# Patient Record
Sex: MALE
[De-identification: names, ages, dates, MRNs, and addresses within clinical notes are randomized per-mention and may not be internally consistent; named-entity substitution may affect disease eponyms.]

---

## 2018-03-07 ENCOUNTER — TRANSCRIPTION ENCOUNTER (OUTPATIENT)
Age: 28
End: 2018-03-07

## 2018-03-07 ENCOUNTER — EMERGENCY (EMERGENCY)
Facility: HOSPITAL | Age: 28
LOS: 1 days | Discharge: HOME | End: 2018-03-07
Attending: EMERGENCY MEDICINE
Payer: SELF-PAY

## 2018-03-07 VITALS
RESPIRATION RATE: 18 BRPM | SYSTOLIC BLOOD PRESSURE: 142 MMHG | TEMPERATURE: 98 F | OXYGEN SATURATION: 100 % | HEIGHT: 70 IN | DIASTOLIC BLOOD PRESSURE: 76 MMHG | HEART RATE: 83 BPM | WEIGHT: 145.06 LBS

## 2018-03-07 PROCEDURE — 99282 EMERGENCY DEPT VISIT SF MDM: CPT

## 2018-03-07 NOTE — ED ADULT TRIAGE NOTE - CHIEF COMPLAINT QUOTE
"I was hit in the face with a ball on Friday." no pain. "I was hit in the face with a ball on Friday." no pain, left nasal congestion. "I was hit in the face with a ball on Friday." no pain, or difficulty breathing. left nasal congestion.

## 2018-03-07 NOTE — ED PROVIDER NOTE - ATTENDING CONTRIBUTION TO CARE
Patient with nasal bridge injury. No nausea/vomiting. No loss of consciousness. Mild discomfort and swelling to bridge of nose and patient concerned that he may have a fracture. Wound occurred 5 days prior.   nasal bridge with mild asymmetrical swelling on the right, cooperative, with capacity and insight, alert, perrl, trachea midline, non-distended, non-tachypneic, no asymmetry of nasal bridge line  patient concerned for fracture of nasal bones and on timeline for repair  patient educated that  follow up md will want own imaging and will await swelling to go down but will give phone numbers for  follow up  No immediate life threatening issues present on history or clinical exam. Patient is a safe disposition home, has capacity and insight into their condition, ambulatroy in the Emergency Department and will follow up with their doctor(s) this week. Patient  understands anticipatory guidance and was given strict return and follow up precautions. The patient has been informed of all concerning signs and symptoms to return to Emergency Department, the necessity to follow up with the PMD/Clinic/follow up provided within 2-3 days was explained, and the patient reports understanding of above with capacity and insight.

## 2018-03-07 NOTE — ED PROVIDER NOTE - OBJECTIVE STATEMENT
26 yo male in room 6 presents to the ER for evaluation of nasal injury. PT states "On Friday 5 days ago I was hit in the face with a volleyball and because I was wearing my glasses they pushed into my nose and caused injury and I want to get checked out to see if  it needs to be fixed immediately". Pt denies LOC at time of injury.  Superficial abrasion 2cm to bridge of nose and superficial abrasion to right side of nose. Tdap  utd as per pt.  Pt denies pain at this time.

## 2018-03-07 NOTE — ED ADULT NURSE NOTE - OBJECTIVE STATEMENT
28 yo male presents to ED following being hit in the face with a volleyball 5d ago. Patient reports noticing deviation of nose and congestion to the left side. At this time in the ED, patient has mild edema on the right side of nose and mild deviation to the left. Patient has no tenderness or krepitus over the nose when palpated. There is no cute distress. Safety and comfort maintained. Will continue to monitor.

## 2018-03-07 NOTE — ED PROVIDER NOTE - MEDICAL DECISION MAKING DETAILS
s/p facial injury from being hit in face with volley ball- no imaging needed- pt to f/u with  facial/plastics s/p facial injury from being hit in face with volley ball- no imaging needed- pt to f/u with  facial/plastics  to limit radiation and await swelling to recede

## 2018-03-07 NOTE — ED PROVIDER NOTE - NOSE FINDINGS
abrasion to bridge of nose and right side of nose with ecchymosis. No septal hematoma, no obvious deformity

## 2018-03-07 NOTE — ED PROVIDER NOTE - PLAN OF CARE
follow up with Dr. Trae Pérez or Dr. arthur-   return to the ER for any concerns follow up with Dr. Trae Pérez or Dr. Trivedi-   return to the ER for any concerns

## 2018-03-07 NOTE — ED PROVIDER NOTE - CARE PLAN
Principal Discharge DX:	Nose injury, initial encounter  Assessment and plan of treatment:	follow up with Dr. Trae Pérez or Dr. arthur-   return to the ER for any concerns

## 2019-07-10 ENCOUNTER — EMERGENCY (EMERGENCY)
Facility: HOSPITAL | Age: 29
LOS: 0 days | Discharge: HOME | End: 2019-07-10
Attending: EMERGENCY MEDICINE | Admitting: EMERGENCY MEDICINE
Payer: SUBSIDIZED

## 2019-07-10 ENCOUNTER — OUTPATIENT (OUTPATIENT)
Dept: OUTPATIENT SERVICES | Facility: HOSPITAL | Age: 29
LOS: 1 days | Discharge: HOME | End: 2019-07-10
Payer: SUBSIDIZED

## 2019-07-10 VITALS
DIASTOLIC BLOOD PRESSURE: 60 MMHG | RESPIRATION RATE: 18 BRPM | OXYGEN SATURATION: 97 % | TEMPERATURE: 98 F | SYSTOLIC BLOOD PRESSURE: 123 MMHG | HEART RATE: 77 BPM

## 2019-07-10 DIAGNOSIS — R10.31 RIGHT LOWER QUADRANT PAIN: ICD-10-CM

## 2019-07-10 DIAGNOSIS — R10.819 ABDOMINAL TENDERNESS, UNSPECIFIED SITE: ICD-10-CM

## 2019-07-10 DIAGNOSIS — Z79.2 LONG TERM (CURRENT) USE OF ANTIBIOTICS: ICD-10-CM

## 2019-07-10 DIAGNOSIS — K52.9 NONINFECTIVE GASTROENTERITIS AND COLITIS, UNSPECIFIED: ICD-10-CM

## 2019-07-10 PROCEDURE — 74177 CT ABD & PELVIS W/CONTRAST: CPT | Mod: 26

## 2019-07-10 PROCEDURE — 99283 EMERGENCY DEPT VISIT LOW MDM: CPT

## 2019-07-10 RX ORDER — METRONIDAZOLE 500 MG
1 TABLET ORAL
Qty: 30 | Refills: 0
Start: 2019-07-10 | End: 2019-07-19

## 2019-07-10 RX ORDER — MOXIFLOXACIN HYDROCHLORIDE TABLETS, 400 MG 400 MG/1
1 TABLET, FILM COATED ORAL
Qty: 20 | Refills: 0
Start: 2019-07-10 | End: 2019-07-19

## 2019-07-10 NOTE — ED PROVIDER NOTE - OBJECTIVE STATEMENT
27 y/o M with no PMH presents with lower right abdominal pain for a few days. Pt has no fever or chills. Pt tried bowel rest yesterday without improvements. Pt is worse in the hospital. Obtaining CT with [place] and IV contrast. Showed colitis of the cecum and ascending colon.

## 2019-07-10 NOTE — ED PROVIDER NOTE - NSFOLLOWUPINSTRUCTIONS_ED_ALL_ED_FT
Colitis    Colitis is inflammation of the colon. Colitis may last a short time (acute) or it may last a long time (chronic).     CAUSES  This condition may be caused by:    Viruses.  Bacteria.  Reactions to medicine.  Certain autoimmune diseases, such as Crohn disease or ulcerative colitis.    SYMPTOMS  Symptoms of this condition include:    Diarrhea.  Passing bloody or tarry stool.  Pain.  Fever.  Vomiting.  Tiredness (fatigue).  Weight loss.  Bloating.  Sudden increase in abdominal pain.  Having fewer bowel movements than usual.    DIAGNOSIS  This condition is diagnosed with a stool test or a blood test. You may also have other tests, including X-rays, a CT scan, or a colonoscopy.    TREATMENT  Treatment may include:    Resting the bowel. This involves not eating or drinking for a period of time.  Fluids that are given through an IV tube.  Medicine for pain and diarrhea.  Antibiotic medicines.  Cortisone medicines.  Surgery.    HOME CARE INSTRUCTIONS  Eating and Drinking    Follow instructions from your health care provider about eating or drinking restrictions.  Drink enough fluid to keep your urine clear or pale yellow.  Work with a dietitian to determine which foods cause your condition to flare up.  Avoid foods that cause flare-ups.  Eat a well-balanced diet.    Medicines    Take over-the-counter and prescription medicines only as told by your health care provider.  If you were prescribed an antibiotic medicine, take it as told by your health care provider. Do not stop taking the antibiotic even if you start to feel better.    General Instructions    Keep all follow-up visits as told by your health care provider. This is important.    SEEK MEDICAL CARE IF:  Your symptoms do not go away.  You develop new symptoms.    SEEK IMMEDIATE MEDICAL CARE IF:  You have a fever that does not go away with treatment.  You develop chills.  You have extreme weakness, fainting, or dehydration.  You have repeated vomiting.  You develop severe pain in your abdomen.  You pass bloody or tarry stool.    ADDITIONAL NOTES AND INSTRUCTIONS    Please follow up with your Primary MD in 24-48 hr.  Seek immediate medical care for any new/worsening signs or symptoms.

## 2019-07-10 NOTE — ED PROVIDER NOTE - NS ED ROS FT
Constitutional: See HPI. No fever/chills.  Eyes: No visual changes, eye pain or discharge.  ENT: No hearing changes, pain, discharge or infections.  Neck: No neck pain or stiffness.  Cardiac: No chest pain, SOB or edema. No chest pain with exertion.  Respiratory: No cough or respiratory distress. No hemoptysis.   GI: No nausea, vomiting, diarrhea or abdominal pain.  : No dysuria, frequency or burning.   MS: No myalgia, muscle weakness, joint pain or back pain.  Neuro: No headache or weakness. No LOC.  Skin: No rash.  Except as documented in the HPI, all other systems are negative. Constitutional: See HPI. No fever/chills.  Eyes: No visual changes, eye pain or discharge.  ENT: No hearing changes, pain, discharge or infections.  Neck: No neck pain or stiffness.  Cardiac: No chest pain, SOB or edema. No chest pain with exertion.  Respiratory: No cough or respiratory distress. No hemoptysis.   GI: see hpi  : No dysuria, frequency or burning.   MS: No myalgia, muscle weakness, joint pain or back pain.  Neuro: No headache or weakness. No LOC.  Skin: No rash.  Except as documented in the HPI, all other systems are negative.

## 2019-07-10 NOTE — ED PROVIDER NOTE - PHYSICAL EXAMINATION
Physical Exam: VS reviewed. Pt is well appearing, in no respiratory distress. MMM. Cap refill <2 seconds.   TMs normal b/l, no erythema, no dullness, no hemotympanum.   Eyes normal with no injection, no discharge, EOMI.  Pharynx with no erythema, no exudates, no stomatitis. No anterior cervical lymph nodes appreciated.   No skin rash noted.   Chest is clear, no wheezing, rales or crackles. No retractions, no distress. Normal and equal breath sounds. Normal heart sounds, no muffling, no murmur appreciated. Abdomen: (+) Mild RLQ pain, soft, ND, no guarding,   Neuro exam grossly intact.

## 2019-07-10 NOTE — ED ADULT NURSE NOTE - OBJECTIVE STATEMENT
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

## 2020-04-26 ENCOUNTER — MESSAGE (OUTPATIENT)
Age: 30
End: 2020-04-26

## 2020-05-04 ENCOUNTER — APPOINTMENT (OUTPATIENT)
Dept: DISASTER EMERGENCY | Facility: HOSPITAL | Age: 30
End: 2020-05-04

## 2020-05-05 LAB
SARS-COV-2 IGG SERPL IA-ACNC: 6.4 INDEX
SARS-COV-2 IGG SERPL QL IA: POSITIVE

## 2020-10-13 ENCOUNTER — TRANSCRIPTION ENCOUNTER (OUTPATIENT)
Age: 30
End: 2020-10-13

## 2021-08-18 ENCOUNTER — TRANSCRIPTION ENCOUNTER (OUTPATIENT)
Age: 31
End: 2021-08-18

## 2022-01-07 ENCOUNTER — TRANSCRIPTION ENCOUNTER (OUTPATIENT)
Age: 32
End: 2022-01-07

## 2022-01-18 ENCOUNTER — TRANSCRIPTION ENCOUNTER (OUTPATIENT)
Age: 32
End: 2022-01-18

## 2023-04-11 ENCOUNTER — LABORATORY RESULT (OUTPATIENT)
Age: 33
End: 2023-04-11

## 2023-04-11 ENCOUNTER — NON-APPOINTMENT (OUTPATIENT)
Age: 33
End: 2023-04-11

## 2023-04-11 ENCOUNTER — APPOINTMENT (OUTPATIENT)
Dept: INTERNAL MEDICINE | Facility: CLINIC | Age: 33
End: 2023-04-11
Payer: COMMERCIAL

## 2023-04-11 VITALS
OXYGEN SATURATION: 99 % | DIASTOLIC BLOOD PRESSURE: 82 MMHG | SYSTOLIC BLOOD PRESSURE: 137 MMHG | WEIGHT: 170 LBS | HEIGHT: 69 IN | HEART RATE: 81 BPM | TEMPERATURE: 98.1 F | BODY MASS INDEX: 25.18 KG/M2

## 2023-04-11 DIAGNOSIS — Z00.00 ENCOUNTER FOR GENERAL ADULT MEDICAL EXAMINATION W/OUT ABNORMAL FINDINGS: ICD-10-CM

## 2023-04-11 PROCEDURE — 36415 COLL VENOUS BLD VENIPUNCTURE: CPT

## 2023-04-11 PROCEDURE — 99385 PREV VISIT NEW AGE 18-39: CPT | Mod: 25

## 2023-04-12 NOTE — PHYSICAL EXAM
[Urethral Meatus] : meatus normal [Urinary Bladder Findings] : the bladder was normal on palpation [Scrotum] : the scrotum was normal [Rectal Exam - Seminal Vesicles] : the seminal vesicles were normal [Epididymis] : the epididymides were normal [Testes Tenderness] : no tenderness of the testes [Testes Mass (___cm)] : there were no testicular masses [Normal] : no joint swelling and grossly normal strength and tone [de-identified] : Multiple benign nevi

## 2023-04-12 NOTE — ASSESSMENT
[FreeTextEntry1] : Blood work done today, patient is medically fit to work, name of the sleep apnea specialist provided for sleep apnea.  Also name provided for dermatology for skin exam to advise at least SPF 30 sunblock

## 2023-04-12 NOTE — HISTORY OF PRESENT ILLNESS
[FreeTextEntry1] : Patient presents for annual physical [de-identified] : Currently works as a radiology resident.  Denies any past medical history, denies any previous family history surgeries.  Did jam the finger recently wearing splint.  Denies any chest pain chest tightness shortness of breath anxiety depression orthopnea paroxysmal nocturnal dyspnea.  Denies any issues with urination passing stool.  Does snore at night has had witnessed apneic episodes.

## 2023-04-12 NOTE — HEALTH RISK ASSESSMENT
[Good] : ~his/her~  mood as  good [FreeTextEntry1] : health maintenance [0] : 2) Feeling down, depressed, or hopeless: Not at all (0) [PHQ-2 Negative - No further assessment needed] : PHQ-2 Negative - No further assessment needed [de-identified] : none [[id number]] : 0

## 2023-04-17 LAB
25(OH)D3 SERPL-MCNC: 14.2 NG/ML
ALBUMIN SERPL ELPH-MCNC: 4.6 G/DL
ALP BLD-CCNC: 98 U/L
ALT SERPL-CCNC: 12 U/L
ANION GAP SERPL CALC-SCNC: 11 MMOL/L
APPEARANCE: CLEAR
AST SERPL-CCNC: 13 U/L
BASOPHILS # BLD AUTO: 0.05 K/UL
BASOPHILS NFR BLD AUTO: 0.6 %
BILIRUB SERPL-MCNC: 0.3 MG/DL
BILIRUBIN URINE: NEGATIVE
BLOOD URINE: ABNORMAL
BUN SERPL-MCNC: 12 MG/DL
CALCIUM SERPL-MCNC: 9.7 MG/DL
CHLORIDE SERPL-SCNC: 103 MMOL/L
CHOLEST SERPL-MCNC: 220 MG/DL
CO2 SERPL-SCNC: 26 MMOL/L
COLOR: NORMAL
CREAT SERPL-MCNC: 0.96 MG/DL
EGFR: 107 ML/MIN/1.73M2
EOSINOPHIL # BLD AUTO: 0.21 K/UL
EOSINOPHIL NFR BLD AUTO: 2.4 %
ESTIMATED AVERAGE GLUCOSE: 103 MG/DL
GLUCOSE QUALITATIVE U: NEGATIVE
GLUCOSE SERPL-MCNC: 116 MG/DL
HBA1C MFR BLD HPLC: 5.2 %
HCT VFR BLD CALC: 43.3 %
HDLC SERPL-MCNC: 58 MG/DL
HGB BLD-MCNC: 13.8 G/DL
IMM GRANULOCYTES NFR BLD AUTO: 0.1 %
KETONES URINE: NEGATIVE
LDLC SERPL CALC-MCNC: 130 MG/DL
LEUKOCYTE ESTERASE URINE: NEGATIVE
LYMPHOCYTES # BLD AUTO: 3.38 K/UL
LYMPHOCYTES NFR BLD AUTO: 39.3 %
MAN DIFF?: NORMAL
MCHC RBC-ENTMCNC: 29.4 PG
MCHC RBC-ENTMCNC: 31.9 GM/DL
MCV RBC AUTO: 92.3 FL
MONOCYTES # BLD AUTO: 0.47 K/UL
MONOCYTES NFR BLD AUTO: 5.5 %
NEUTROPHILS # BLD AUTO: 4.49 K/UL
NEUTROPHILS NFR BLD AUTO: 52.1 %
NITRITE URINE: NEGATIVE
NONHDLC SERPL-MCNC: 162 MG/DL
PH URINE: 6.5
PLATELET # BLD AUTO: 255 K/UL
POTASSIUM SERPL-SCNC: 3.9 MMOL/L
PROT SERPL-MCNC: 7.1 G/DL
PROTEIN URINE: NORMAL
RBC # BLD: 4.69 M/UL
RBC # FLD: 12.3 %
SODIUM SERPL-SCNC: 141 MMOL/L
SPECIFIC GRAVITY URINE: 1.02
TRIGL SERPL-MCNC: 161 MG/DL
TSH SERPL-ACNC: 1.9 UIU/ML
UROBILINOGEN URINE: NORMAL
VIT B12 SERPL-MCNC: 227 PG/ML
WBC # FLD AUTO: 8.61 K/UL

## 2023-04-18 RX ORDER — ERGOCALCIFEROL 1.25 MG/1
1.25 MG CAPSULE ORAL
Qty: 12 | Refills: 0 | Status: COMPLETED | COMMUNITY
Start: 2023-04-18 | End: 2023-07-17

## 2023-05-19 ENCOUNTER — APPOINTMENT (OUTPATIENT)
Dept: ORTHOPEDIC SURGERY | Facility: CLINIC | Age: 33
End: 2023-05-19
Payer: COMMERCIAL

## 2023-05-19 DIAGNOSIS — M20.011 MALLET FINGER OF RIGHT FINGER(S): ICD-10-CM

## 2023-05-19 PROCEDURE — 73140 X-RAY EXAM OF FINGER(S): CPT | Mod: RT

## 2023-05-19 PROCEDURE — 29130 APPL FINGER SPLINT STATIC: CPT

## 2023-05-19 PROCEDURE — 99204 OFFICE O/P NEW MOD 45 MIN: CPT | Mod: 25

## 2023-05-19 NOTE — ASSESSMENT
[FreeTextEntry1] : Patient comes in with pain in his right middle finger. In March he was putting his hand in his pocket and felt a pop.  He started splinting when it first happened for 3 weeks.  He then played volleyball and the splints came off the finger.  He noticed he had drooping again.  He then after that was splinting for 8 weeks.\par \par Right hand: Discolored skin from PIP joint distally secondary to splinting, nontender ovation long finger, decreased flexion of the finger, able to extend with a neutral finger, neurovascular intact\par \par X-rays are negative\par \par The pathology was discussed with the patient at length including the use of diagrams drawn in the office.  We discussed that the recommended treatment is closed treatment with full time splinting for 6weeks.  During those 6 weeks the splint cannot come off- if the finger tip bends for even a fraction of a second the healing tissue tears and the 6 week period would start all over again.  The splint must remain clean and dry and needs to be well covered in the shower.  The patient verbalized understanding of the need for full time 24/7 splinting.  We also discussed the possibility of pin placement surgically to allow a splint to come on and off, although it must be worn except for hygiene to protect the pin and prevent bending of the pin.  We discussed that the only true indication for surgery is an incongruent joint.   We discussed that there were 4 possibilities: a perfect finger, a stiff finger, a bent finger or a dorsal bump over the tendon attachment site.  There could be combinations of stiff, bent and bumpy finger. We discussed that after 6 weeks of full time splinting I recommend 6 weeks of nighttime splinting.  The patient understands that splinting may lead to skin breakdown under the splint.  There is also a possibility of surgery in the future up to and including fusion.  The patient agrees to recommended plan of splinting.\par \par Patient will start night-time splinting for 6 weeks as he has already done full-time splinting for 8 weeks. He was advised to wear the splint when he is active as well. He will follow up in 3 weeks.  If prior to that he starts to have significant drooping more than 10 to 15 degrees he should start full-time splinting.  If he has any issues he will let me know.  We discussed specifics on pressure tension along the finger.

## 2023-06-06 ENCOUNTER — APPOINTMENT (OUTPATIENT)
Dept: ORTHOPEDIC SURGERY | Facility: CLINIC | Age: 33
End: 2023-06-06

## 2023-06-09 ENCOUNTER — APPOINTMENT (OUTPATIENT)
Dept: PULMONOLOGY | Facility: CLINIC | Age: 33
End: 2023-06-09
Payer: COMMERCIAL

## 2023-06-09 VITALS
WEIGHT: 165.6 LBS | BODY MASS INDEX: 24.53 KG/M2 | HEART RATE: 64 BPM | OXYGEN SATURATION: 98 % | HEIGHT: 69 IN | DIASTOLIC BLOOD PRESSURE: 78 MMHG | SYSTOLIC BLOOD PRESSURE: 123 MMHG | TEMPERATURE: 98 F

## 2023-06-09 PROCEDURE — 99203 OFFICE O/P NEW LOW 30 MIN: CPT

## 2023-06-09 NOTE — HISTORY OF PRESENT ILLNESS
[Never] : never [Obstructive Sleep Apnea] : obstructive sleep apnea [Awakes Unrefreshed] : awakes unrefreshed [Awakes with Dry Mouth] : awakes with dry mouth [Snoring] : snoring [Tired while Driving] : tired while driving [Witnessed Apneas] : witnessed apneas [TextBox_4] : Patient here for initial sleep apnea evaluation.  Reports chronic nonrestorative sleep.  Difficulty maintaining alertness while driving.  Was having 1 hour commute to work every day and was difficult to stay awake while driving.  Commute plan is changing.\par Also reports difficulty with lying flat.\par Chokes when he lies flat.  Does not report acid reflux symptoms. [Awakes with Headache] : does not awaken with headache [Frequent Nocturnal Awakening] : denies frequent nocturnal awakening

## 2023-06-09 NOTE — PHYSICAL EXAM
[No Acute Distress] : no acute distress [Normal Appearance] : normal appearance [No Neck Mass] : no neck mass [Normal Rate/Rhythm] : normal rate/rhythm [Normal S1, S2] : normal s1, s2 [No Murmurs] : no murmurs [No Resp Distress] : no resp distress [Clear to Auscultation Bilaterally] : clear to auscultation bilaterally [No Abnormalities] : no abnormalities [Benign] : benign [Normal Gait] : normal gait [No Clubbing] : no clubbing [No Cyanosis] : no cyanosis [No Edema] : no edema [FROM] : FROM [Normal Color/ Pigmentation] : normal color/ pigmentation [No Focal Deficits] : no focal deficits [Oriented x3] : oriented x3 [Normal Affect] : normal affect [Elongated Uvula] : elongated uvula [II] : Mallampati Class: II [3+] : Right Tonsil: 3+ [TextBox_11] : Excessive foam in mouth suggestive of reflux

## 2023-06-10 ENCOUNTER — FORM ENCOUNTER (OUTPATIENT)
Age: 33
End: 2023-06-10

## 2023-06-11 PROCEDURE — 95800 SLP STDY UNATTENDED: CPT

## 2023-06-13 ENCOUNTER — APPOINTMENT (OUTPATIENT)
Dept: ORTHOPEDIC SURGERY | Facility: CLINIC | Age: 33
End: 2023-06-13

## 2023-06-21 ENCOUNTER — APPOINTMENT (OUTPATIENT)
Dept: PULMONOLOGY | Facility: CLINIC | Age: 33
End: 2023-06-21
Payer: COMMERCIAL

## 2023-06-21 DIAGNOSIS — G47.33 OBSTRUCTIVE SLEEP APNEA (ADULT) (PEDIATRIC): ICD-10-CM

## 2023-06-21 PROCEDURE — 99213 OFFICE O/P EST LOW 20 MIN: CPT | Mod: 95

## 2023-06-21 NOTE — PROCEDURE
[FreeTextEntry1] : Home sleep study demonstrates positional sleep apnea with severe SUSAN while supine.  Nonsupine AHI was normal

## 2023-06-21 NOTE — HISTORY OF PRESENT ILLNESS
[TextBox_4] : Telehealth visit to discuss results of home sleep study.  No change in history as reported.  Reports significant choking and difficulty breathing at night when lying on his back.  Even when sleeping on his side he still snores

## 2023-06-21 NOTE — ASSESSMENT
[FreeTextEntry1] : Treatment options discussed positional therapy oral appliance or CPAP.  He reports that he still snores when he is on his side so therefore positional therapy is not optimal.  Recommend oral appliance evaluation.  If not tolerated or not effective will consider for CPAP\par \par Results of sleep study reviewed with patient. Treatment options including weight loss oral appliance therapy and PAP therapy were reviewed with patient. After careful review of sleep study patient desire and risk factors recommend initial therapy with oral appliance therapy. Patient referred to sleep dentist. Should return to office after completion of fitting for clinical reassessment and to decide whether further sleep testing will be required.\par

## 2023-06-21 NOTE — REASON FOR VISIT
[Home] : at home, [unfilled] , at the time of the visit. [Medical Office: ([facility])___] : at the medical office located in  [Patient] : the patient

## 2024-09-23 ENCOUNTER — APPOINTMENT (OUTPATIENT)
Dept: INTERNAL MEDICINE | Facility: CLINIC | Age: 34
End: 2024-09-23
Payer: COMMERCIAL

## 2024-09-23 VITALS
HEIGHT: 69 IN | WEIGHT: 169 LBS | TEMPERATURE: 97.9 F | DIASTOLIC BLOOD PRESSURE: 69 MMHG | BODY MASS INDEX: 25.03 KG/M2 | OXYGEN SATURATION: 99 % | HEART RATE: 79 BPM | SYSTOLIC BLOOD PRESSURE: 108 MMHG

## 2024-09-23 DIAGNOSIS — Z00.00 ENCOUNTER FOR GENERAL ADULT MEDICAL EXAMINATION W/OUT ABNORMAL FINDINGS: ICD-10-CM

## 2024-09-23 PROCEDURE — 36415 COLL VENOUS BLD VENIPUNCTURE: CPT

## 2024-09-23 PROCEDURE — 99395 PREV VISIT EST AGE 18-39: CPT

## 2024-09-25 NOTE — HISTORY OF PRESENT ILLNESS
[FreeTextEntry1] : Patient presents for a comprehensive annual physical exam.  [de-identified] : Patient is a 33 yr old male present today for a comprehensive annual physical exam. Starting new job 10/1, discussed call for QFT titers results by 10/3.   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), denies BCG vaccine while young, QFT negative ([place]). Ppd site measures at 15mm.  Pt has no acute concerns.   Patient reports everything is stable. Denies any CP, chest tightness or SOB. Denies any abdominal pain, urinary symptom, or change in bowel habits. Denies any fever, chills, or night sweats. 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

## 2024-09-25 NOTE — HEALTH RISK ASSESSMENT
[Good] : ~his/her~  mood as  good [No] : No [FreeTextEntry1] : health maintenance [de-identified] : PULM, ORTHO,

## 2024-09-25 NOTE — HEALTH RISK ASSESSMENT
[Good] : ~his/her~  mood as  good [No] : No [FreeTextEntry1] : health maintenance [de-identified] : PULM, ORTHO,

## 2024-09-25 NOTE — HISTORY OF PRESENT ILLNESS
[FreeTextEntry1] : Patient presents for a comprehensive annual physical exam.  [de-identified] : Patient is a 33 yr old male present today for a comprehensive annual physical exam. Starting new job 10/1, discussed call for QFT titers results by 10/3.   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 Ppd site measures at 15mm.  Pt has no acute concerns.   Patient reports everything is stable. Denies any CP, chest tightness or SOB. Denies any abdominal pain, urinary symptom, or change in bowel habits. Denies any fever, chills, or night sweats. 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

## 2024-09-25 NOTE — ASSESSMENT
[FreeTextEntry1] : Blood work done today QuantiFERON ordered, patient denies any fevers chills night sweats unintentional weight loss.

## 2024-09-27 ENCOUNTER — TRANSCRIPTION ENCOUNTER (OUTPATIENT)
Age: 34
End: 2024-09-27

## 2024-09-27 LAB
25(OH)D3 SERPL-MCNC: 23.9 NG/ML
ALBUMIN SERPL ELPH-MCNC: 4.6 G/DL
ALP BLD-CCNC: 103 U/L
ALT SERPL-CCNC: 13 U/L
ANION GAP SERPL CALC-SCNC: 12 MMOL/L
APPEARANCE: CLEAR
AST SERPL-CCNC: 18 U/L
BASOPHILS # BLD AUTO: 0.05 K/UL
BASOPHILS NFR BLD AUTO: 0.7 %
BILIRUB SERPL-MCNC: 0.5 MG/DL
BILIRUBIN URINE: NEGATIVE
BLOOD URINE: NEGATIVE
BUN SERPL-MCNC: 12 MG/DL
CALCIUM SERPL-MCNC: 9.5 MG/DL
CHLORIDE SERPL-SCNC: 104 MMOL/L
CHOLEST SERPL-MCNC: 245 MG/DL
CO2 SERPL-SCNC: 24 MMOL/L
COLOR: YELLOW
CREAT SERPL-MCNC: 1 MG/DL
EGFR: 101 ML/MIN/1.73M2
EOSINOPHIL # BLD AUTO: 0.14 K/UL
EOSINOPHIL NFR BLD AUTO: 1.8 %
ESTIMATED AVERAGE GLUCOSE: 97 MG/DL
GLUCOSE QUALITATIVE U: NEGATIVE MG/DL
GLUCOSE SERPL-MCNC: 92 MG/DL
HBA1C MFR BLD HPLC: 5 %
HCT VFR BLD CALC: 42.9 %
HDLC SERPL-MCNC: 54 MG/DL
HGB BLD-MCNC: 14.6 G/DL
IMM GRANULOCYTES NFR BLD AUTO: 0.4 %
KETONES URINE: NEGATIVE MG/DL
LDLC SERPL CALC-MCNC: 181 MG/DL
LEUKOCYTE ESTERASE URINE: NEGATIVE
LYMPHOCYTES # BLD AUTO: 2.49 K/UL
LYMPHOCYTES NFR BLD AUTO: 32.4 %
MAN DIFF?: NORMAL
MCHC RBC-ENTMCNC: 31.1 PG
MCHC RBC-ENTMCNC: 34 GM/DL
MCV RBC AUTO: 91.3 FL
MONOCYTES # BLD AUTO: 0.35 K/UL
MONOCYTES NFR BLD AUTO: 4.6 %
NEUTROPHILS # BLD AUTO: 4.62 K/UL
NEUTROPHILS NFR BLD AUTO: 60.1 %
NITRITE URINE: NEGATIVE
NONHDLC SERPL-MCNC: 191 MG/DL
PH URINE: 7
PLATELET # BLD AUTO: 322 K/UL
POTASSIUM SERPL-SCNC: 4.6 MMOL/L
PROT SERPL-MCNC: 7.6 G/DL
PROTEIN URINE: NORMAL MG/DL
RBC # BLD: 4.7 M/UL
RBC # FLD: 12.1 %
SODIUM SERPL-SCNC: 140 MMOL/L
SPECIFIC GRAVITY URINE: 1.02
TRIGL SERPL-MCNC: 62 MG/DL
TSH SERPL-ACNC: 1.3 UIU/ML
UROBILINOGEN URINE: 0.2 MG/DL
VIT B12 SERPL-MCNC: 269 PG/ML
WBC # FLD AUTO: 7.68 K/UL

## 2024-09-29 LAB
M TB IFN-G BLD-IMP: ABNORMAL
QUANTIFERON TB PLUS MITOGEN MINUS NIL: 0.05 IU/ML
QUANTIFERON TB PLUS NIL: 0.36 IU/ML
QUANTIFERON TB PLUS TB1 MINUS NIL: 0 IU/ML
QUANTIFERON TB PLUS TB2 MINUS NIL: 0.01 IU/ML

## 2024-09-30 ENCOUNTER — APPOINTMENT (OUTPATIENT)
Dept: RADIOLOGY | Facility: CLINIC | Age: 34
End: 2024-09-30
Payer: COMMERCIAL

## 2024-09-30 DIAGNOSIS — Z11.1 ENCOUNTER FOR SCREENING FOR RESPIRATORY TUBERCULOSIS: ICD-10-CM

## 2024-09-30 PROCEDURE — 71046 X-RAY EXAM CHEST 2 VIEWS: CPT

## 2024-10-01 ENCOUNTER — TRANSCRIPTION ENCOUNTER (OUTPATIENT)
Age: 34
End: 2024-10-01